# Patient Record
Sex: FEMALE | Race: WHITE
[De-identification: names, ages, dates, MRNs, and addresses within clinical notes are randomized per-mention and may not be internally consistent; named-entity substitution may affect disease eponyms.]

---

## 2021-06-10 ENCOUNTER — HOSPITAL ENCOUNTER (EMERGENCY)
Dept: HOSPITAL 33 - ED | Age: 21
Discharge: ANOTHER HOSPITAL | End: 2021-06-10
Payer: COMMERCIAL

## 2021-06-10 VITALS — OXYGEN SATURATION: 98 %

## 2021-06-10 VITALS — DIASTOLIC BLOOD PRESSURE: 65 MMHG | HEART RATE: 90 BPM | SYSTOLIC BLOOD PRESSURE: 107 MMHG

## 2021-06-10 DIAGNOSIS — D61.1: ICD-10-CM

## 2021-06-10 DIAGNOSIS — D69.6: ICD-10-CM

## 2021-06-10 DIAGNOSIS — D64.9: Primary | ICD-10-CM

## 2021-06-10 LAB
ABO GROUP BLD: (no result)
ALBUMIN SERPL-MCNC: 4.6 G/DL (ref 3.5–5)
ALP SERPL-CCNC: 57 U/L (ref 38–126)
ALT SERPL-CCNC: 12 U/L (ref 0–35)
ANION GAP SERPL CALC-SCNC: 13.5 MEQ/L (ref 5–15)
AST SERPL QL: 23 U/L (ref 14–36)
BASOPHILS # BLD AUTO: 0.02 10*3/UL (ref 0–0.4)
BASOPHILS NFR BLD AUTO: 0.6 % (ref 0–0.4)
BILIRUB BLD-MCNC: 0.8 MG/DL (ref 0.2–1.3)
BUN SERPL-MCNC: 7 MG/DL (ref 7–17)
CALCIUM SPEC-MCNC: 9.1 MG/DL (ref 8.4–10.2)
CHLORIDE SERPL-SCNC: 101 MMOL/L (ref 98–107)
CO2 SERPL-SCNC: 25 MMOL/L (ref 22–30)
CREAT SERPL-MCNC: 0.36 MG/DL (ref 0.52–1.04)
EOSINOPHIL # BLD AUTO: 0 10*3/UL (ref 0–0.5)
GFR SERPLBLD BASED ON 1.73 SQ M-ARVRAT: > 60 ML/MIN
GLUCOSE SERPL-MCNC: 87 MG/DL (ref 74–106)
GLUCOSE UR-MCNC: NEGATIVE MG/DL
HCT VFR BLD AUTO: 23.4 % (ref 35–47)
HGB BLD-MCNC: 5.8 GM/DL (ref 12–16)
IRON SERPL-MCNC: 21 UG/DL (ref 37–170)
IRON+TIBC PNL SERPL: 445 UG/DL (ref 265–462)
LYMPHOCYTES # SPEC AUTO: 1.24 10*3/UL (ref 1–4.6)
MAGNESIUM SERPL-MCNC: 2 MG/DL (ref 1.6–2.3)
MCH RBC QN AUTO: 16.4 PG (ref 26–32)
MCHC RBC AUTO-ENTMCNC: 24.8 G/DL (ref 32–36)
MONOCYTES # BLD AUTO: 0.52 10*3/UL (ref 0–1.3)
PLATELET # BLD AUTO: 153 K/MM3 (ref 150–450)
POTASSIUM SERPLBLD-SCNC: 3.4 MMOL/L (ref 3.5–5.1)
PROT SERPL-MCNC: 7.3 G/DL (ref 6.3–8.2)
PROT UR STRIP-MCNC: 100 MG/DL
RBC # BLD AUTO: 3.54 M/MM3 (ref 4.1–5.4)
RBC #/AREA URNS HPF: (no result) /HPF (ref 0–2)
RH BLD: POSITIVE
SODIUM SERPL-SCNC: 136 MMOL/L (ref 137–145)
TYPE + XM PNL BLD: (no result)
WBC # BLD AUTO: 3.5 K/MM3 (ref 4–10.5)
WBC #/AREA URNS HPF: >100 /HPF (ref 0–5)

## 2021-06-10 PROCEDURE — 86900 BLOOD TYPING SEROLOGIC ABO: CPT

## 2021-06-10 PROCEDURE — 86922 COMPATIBILITY TEST ANTIGLOB: CPT

## 2021-06-10 PROCEDURE — 36415 COLL VENOUS BLD VENIPUNCTURE: CPT

## 2021-06-10 PROCEDURE — 85045 AUTOMATED RETICULOCYTE COUNT: CPT

## 2021-06-10 PROCEDURE — 85025 COMPLETE CBC W/AUTO DIFF WBC: CPT

## 2021-06-10 PROCEDURE — 96365 THER/PROPH/DIAG IV INF INIT: CPT

## 2021-06-10 PROCEDURE — 36000 PLACE NEEDLE IN VEIN: CPT

## 2021-06-10 PROCEDURE — 87186 SC STD MICRODIL/AGAR DIL: CPT

## 2021-06-10 PROCEDURE — 81001 URINALYSIS AUTO W/SCOPE: CPT

## 2021-06-10 PROCEDURE — 36430 TRANSFUSION BLD/BLD COMPNT: CPT

## 2021-06-10 PROCEDURE — 82746 ASSAY OF FOLIC ACID SERUM: CPT

## 2021-06-10 PROCEDURE — 84466 ASSAY OF TRANSFERRIN: CPT

## 2021-06-10 PROCEDURE — 93041 RHYTHM ECG TRACING: CPT

## 2021-06-10 PROCEDURE — 99285 EMERGENCY DEPT VISIT HI MDM: CPT

## 2021-06-10 PROCEDURE — 93005 ELECTROCARDIOGRAM TRACING: CPT

## 2021-06-10 PROCEDURE — 83540 ASSAY OF IRON: CPT

## 2021-06-10 PROCEDURE — 83735 ASSAY OF MAGNESIUM: CPT

## 2021-06-10 PROCEDURE — 82728 ASSAY OF FERRITIN: CPT

## 2021-06-10 PROCEDURE — 86850 RBC ANTIBODY SCREEN: CPT

## 2021-06-10 PROCEDURE — 87086 URINE CULTURE/COLONY COUNT: CPT

## 2021-06-10 PROCEDURE — 96360 HYDRATION IV INFUSION INIT: CPT

## 2021-06-10 PROCEDURE — 86901 BLOOD TYPING SEROLOGIC RH(D): CPT

## 2021-06-10 PROCEDURE — 80053 COMPREHEN METABOLIC PANEL: CPT

## 2021-06-10 PROCEDURE — 87077 CULTURE AEROBIC IDENTIFY: CPT

## 2021-06-10 PROCEDURE — 94760 N-INVAS EAR/PLS OXIMETRY 1: CPT

## 2021-06-10 PROCEDURE — 83550 IRON BINDING TEST: CPT

## 2021-06-10 NOTE — ERPHSYRPT
- History of Present Illness


Time Seen by Provider: 06/10/21 17:55


Source: patient


Exam Limitations: no limitations


Patient Subjective Stated Complaint: Abnormal labs


Triage Nursing Assessment: Patient ambulated back to ED and transferred self to 

bed. Patient A+O  X 3. Patient's skin pale, warm and dry. Patient states today 

she got routine blood work done and received a call to come to ED due to 

abnormal labs.  NP RUTH Snyder called ER and stated patient's hemaglobin was 5.5

and needed to be further evaluated. Patient denies pain or discomfort. Lungs 

clear a/p ferny.  Slight swelling noted to BLE.  Patient received Mandeep and 

Mandeep COVID vaccine yesterday.  Patient does state she has been having heavier

menstural cycles.


Physician History: 


Patient is a 21-year-old female presents to our ED as a referral from her 

primary care doctor for evaluation of profound anemia.  Patient has a hemoglobin

of 5.3.  Patient otherwise asymptomatic.  She complains of no chest pain.  No 

fatigue.  No nausea or vomiting no diarrhea.  No rash.  Symptoms are minimal at 

this time.  Patient states she received a Covid vaccination in the past week.  

No nausea or vomiting no diaphoresis no lightheadedness.





Timing/Duration: today


Severity: mild


Modifying Factors: Improves With: nothing


Associated Symptoms: denies symptoms


Allergies/Adverse Reactions: 








No Known Drug Allergies Allergy (Unverified 06/10/21 17:47)


   





Home Medications: 








No Reportable Medications [No Reported Medications]  06/10/21 [History]





Hx Influenza Vaccination/Date Given: Yes


Hx Pneumococcal Vaccination/Date Given: No


Immunizations Up to Date: Yes





Travel Risk





- International Travel


Have you traveled outside of the country in past 3 weeks: No





- Coronavirus Screening


Are you exhibiting any of the following symptoms?: No


Close contact with a COVID-19 positive Pt in past 14-21 Days: No





- Vaccine Status


Have you recieved a Covid-19 vaccination: Yes


: [employer]





- Review of Systems


Constitutional: No Symptoms, No Fever, No Chills


Eyes: No Symptoms


Ears, Nose, & Throat: No Symptoms


Respiratory: No Symptoms, No Cough, No Dyspnea


Cardiac: No Symptoms, No Chest Pain, No Edema, No Syncope


Abdominal/Gastrointestinal: No Symptoms, No Abdominal Pain, No Nausea, No 

Vomiting, No Diarrhea


Genitourinary Symptoms: No Symptoms, No Dysuria


Musculoskeletal: No Symptoms, No Back Pain, No Neck Pain


Skin: No Symptoms, No Rash


Neurological: No Symptoms, No Dizziness, No Focal Weakness, No Sensory Changes


Psychological: No Symptoms


Endocrine: No Symptoms


Hematologic/Lymphatic: No Symptoms


Immunological/Allergic: No Symptoms


All Other Systems: Reviewed and Negative





- Past Medical History


Pertinent Past Medical History: No


Neurological History: No Pertinent History


ENT History: No Pertinent History


Cardiac History: No Pertinent History


Respiratory History: No Pertinent History


Endocrine Medical History: No Pertinent History


Musculoskeletal History: No Pertinent History


GI Medical History: No Pertinent History


 History: No Pertinent History


Psycho-Social History: No Pertinent History


Female Reproductive Disorders: No Pertinent History





- Past Surgical History


Past Surgical History: No


Neuro Surgical History: No Pertinent History


Cardiac: No Pertinent History


Respiratory: No Pertinent History


Gastrointestinal: No Pertinent History


Genitourinary: No Pertinent History


Musculoskeletal: No Pertinent History


Female Surgical History: No Pertinent History





- Social History


Smoking Status: Never smoker


Exposure to second hand smoke: No


Drug Use: none


Patient Lives Alone: Yes





- Female History


Hx Last Menstrual Period: last week


Hx Pregnant Now: No





- Nursing Vital Signs


Nursing Vital Signs: 





                               Initial Vital Signs











Temperature  98.5 F   06/10/21 17:49


 


Pulse Rate  99 H  06/10/21 17:49


 


Respiratory Rate  18   06/10/21 17:49


 


Blood Pressure  125/71   06/10/21 17:49


 


O2 Sat by Pulse Oximetry  98   06/10/21 17:49








                                   Pain Scale











Pain Intensity                 0

















- Physical Exam


General Appearance: no apparent distress, alert


Eye Exam: PERRL/EOMI, eyes nml inspection


Ears, Nose, Throat Exam: normal ENT inspection, TMs normal, pharynx normal, 

moist mucous membranes


Neck Exam: normal inspection, non-tender, supple, full range of motion


Respiratory Exam: normal breath sounds, lungs clear, No respiratory distress


Cardiovascular Exam: regular rate/rhythm, normal heart sounds, normal peripheral

 pulses


Gastrointestinal/Abdomen Exam: soft, normal bowel sounds, No tenderness, No mass


Back Exam: normal inspection, normal range of motion, No CVA tenderness, No 

vertebral tenderness


Extremity Exam: normal inspection, normal range of motion, pelvis stable


Neurologic Exam: alert, oriented x 3, cooperative, normal mood/affect, nml 

cerebellar function, nml station & gait, sensation nml, No motor deficits


Skin Exam: normal color, warm, dry, No rash


Lymphatic Exam: No adenopathy


**SpO2 Interpretation**: normal


SpO2: 98


O2 Delivery: Room Air





- Course


Nursing assessment & vital signs reviewed: Yes


EKG Interpreted by Me: RATE ([id number]), Sinus Rhythm, NORMAL AXIS, NORMAL INTERVALS


Ordered Tests: 





                               Active Orders 24 hr











 Category Date Time Status


 


 Cardiac Monitor STAT Care  06/10/21 17:55 Active


 


 EKG-ER Only STAT Care  06/10/21 17:54 Active


 


 IV Insertion STAT Care  06/10/21 17:54 Active


 


 Pulse Oximetry (ED) STAT Care  06/10/21 17:54 Active


 


 CBC W DIFF Stat Lab  06/10/21 18:05 Completed


 


 CMP Stat Lab  06/10/21 18:05 Completed


 


 CULTURE,URINE Stat Lab  06/10/21 17:57 Received


 


 Ferritin Stat Lab  06/10/21 Ordered


 


 Folate (Folic Acid) Stat Lab  06/10/21 Ordered


 


 MAGNESIUM Stat Lab  06/10/21 18:05 Completed


 


 UA W/RFX UR CULTURE Stat Lab  06/10/21 17:57 Completed








Medication Summary














Discontinued Medications














Generic Name Dose Route Start Last Admin





  Trade Name Freq  PRN Reason Stop Dose Admin


 


Sodium Chloride  1,000 mls @ 999 mls/hr  06/10/21 17:54  06/10/21 19:06





  Sodium Chloride 0.9% 1000 Ml  IV  06/10/21 18:54  Infused





  .Q1H1M STA   Infusion


 


Sodium Chloride  Confirm  06/10/21 17:58 





  Sodium Chloride 0.9% 1000 Ml  Administered  06/10/21 17:59 





  Dose  





  1,000 mls @ ud  





  .ROUTE  





  .STK-MED ONE  











Lab/Rad Data: 





                           Laboratory Result Diagrams





                                 06/10/21 18:05 





                                 06/10/21 18:05 





                               Laboratory Results











  06/10/21 06/10/21 06/10/21 Range/Units





  18:05 18:05 18:05 


 


WBC     (4.0-10.5)  K/mm3


 


RBC     (4.1-5.4)  M/mm3


 


Hgb     (12.0-16.0)  gm/dl


 


Hct     (35-47)  %


 


MCV     ()  fl


 


MCH     (26-32)  pg


 


MCHC     (32-36)  g/dl


 


RDW     (11.5-14.0)  %


 


Plt Count     (150-450)  K/mm3


 


MPV     (7.5-11.0)  fl


 


Gran %     (36.0-66.0)  %


 


Eos # (Auto)     (0-0.5)  


 


Absolute Lymphs (auto)     (1.0-4.6)  


 


Absolute Monos (auto)     (0.0-1.3)  


 


Lymphocytes %     (24.0-44.0)  %


 


Monocytes %     (0.0-12.0)  %


 


Eosinophils %     (0.00-5.0)  %


 


Basophils %     (0.0-0.4)  %


 


Absolute Granulocytes     (1.4-6.9)  


 


Basophils #     (0-0.4)  


 


Sodium     (137-145)  mmol/L


 


Potassium     (3.5-5.1)  mmol/L


 


Chloride     ()  mmol/L


 


Carbon Dioxide     (22-30)  mmol/L


 


Anion Gap     (5-15)  MEQ/L


 


BUN     (7-17)  mg/dL


 


Creatinine     (0.52-1.04)  mg/dL


 


Estimated GFR     ML/MIN


 


Glucose     ()  mg/dL


 


Calcium     (8.4-10.2)  mg/dL


 


Magnesium     (1.6-2.3)  mg/dL


 


Iron  21 L  22 L   ()  ug/dL


 


TIBC  445    (265-462)  ug/dL


 


Iron Saturation  5 L    (20-39)  %


 


Total Bilirubin     (0.2-1.3)  mg/dL


 


AST     (14-36)  U/L


 


ALT     (0-35)  U/L


 


Alkaline Phosphatase     ()  U/L


 


Serum Total Protein     (6.3-8.2)  g/dL


 


Albumin     (3.5-5.0)  g/dL


 


Urine Color     (YELLOW)  


 


Urine Appearance     (CLEAR)  


 


Urine pH     (5-6)  


 


Ur Specific Gravity     (1.005-1.025)  


 


Urine Protein     (Negative)  


 


Urine Ketones     (NEGATIVE)  


 


Urine Blood     (0-5)  Tom/ul


 


Urine Nitrite     (NEGATIVE)  


 


Urine Bilirubin     (NEGATIVE)  


 


Urine Urobilinogen     (0-1)  mg/dL


 


Ur Leukocyte Esterase     (NEGATIVE)  


 


Urine WBC (Auto)     (0-5)  /HPF


 


Urine RBC (Auto)     (0-2)  /HPF


 


U Hyaline Cast (Auto)     (0-2)  /LPF


 


U Epithel Cells (Auto)     (FEW)  /HPF


 


Urine Bacteria (Auto)     (NEGATIVE)  /HPF


 


Urine Mucus (Auto)     (NEGATIVE)  /HPF


 


Urine Culture Reflexed     (NO)  


 


Urine Glucose     (NEGATIVE)  mg/dL


 


ABO Group    A  


 


Rh Factor    POSITIVE  


 


Antibody Screen    NEGATIVE  (NEGATIVE)  














  06/10/21 06/10/21 06/10/21 Range/Units





  18:05 18:05 17:57 


 


WBC   3.5 L   (4.0-10.5)  K/mm3


 


RBC   3.54 L   (4.1-5.4)  M/mm3


 


Hgb   5.8 L*   (12.0-16.0)  gm/dl


 


Hct   23.4 L   (35-47)  %


 


MCV   66.1 L   ()  fl


 


MCH   16.4 L   (26-32)  pg


 


MCHC   24.8 L   (32-36)  g/dl


 


RDW   19.7 H   (11.5-14.0)  %


 


Plt Count   153   (150-450)  K/mm3


 


MPV   9.3   (7.5-11.0)  fl


 


Gran %   49.3   (36.0-66.0)  %


 


Eos # (Auto)   0   (0-0.5)  


 


Absolute Lymphs (auto)   1.24   (1.0-4.6)  


 


Absolute Monos (auto)   0.52   (0.0-1.3)  


 


Lymphocytes %   35.3   (24.0-44.0)  %


 


Monocytes %   14.8 H   (0.0-12.0)  %


 


Eosinophils %   0.0   (0.00-5.0)  %


 


Basophils %   0.6   (0.0-0.4)  %


 


Absolute Granulocytes   1.73   (1.4-6.9)  


 


Basophils #   0.02   (0-0.4)  


 


Sodium  136 L    (137-145)  mmol/L


 


Potassium  3.4 L    (3.5-5.1)  mmol/L


 


Chloride  101    ()  mmol/L


 


Carbon Dioxide  25    (22-30)  mmol/L


 


Anion Gap  13.5    (5-15)  MEQ/L


 


BUN  7    (7-17)  mg/dL


 


Creatinine  0.36 L    (0.52-1.04)  mg/dL


 


Estimated GFR  > 60.0    ML/MIN


 


Glucose  87    ()  mg/dL


 


Calcium  9.1    (8.4-10.2)  mg/dL


 


Magnesium  2.0    (1.6-2.3)  mg/dL


 


Iron     ()  ug/dL


 


TIBC     (265-462)  ug/dL


 


Iron Saturation     (20-39)  %


 


Total Bilirubin  0.80    (0.2-1.3)  mg/dL


 


AST  23    (14-36)  U/L


 


ALT  12    (0-35)  U/L


 


Alkaline Phosphatase  57    ()  U/L


 


Serum Total Protein  7.3    (6.3-8.2)  g/dL


 


Albumin  4.6    (3.5-5.0)  g/dL


 


Urine Color    HAYLEE  (YELLOW)  


 


Urine Appearance    CLOUDY  (CLEAR)  


 


Urine pH    6.0  (5-6)  


 


Ur Specific Gravity    1.025  (1.005-1.025)  


 


Urine Protein    100  (Negative)  


 


Urine Ketones    TRACE  (NEGATIVE)  


 


Urine Blood    NEGATIVE  (0-5)  Tom/ul


 


Urine Nitrite    POSITIVE  (NEGATIVE)  


 


Urine Bilirubin    NEGATIVE  (NEGATIVE)  


 


Urine Urobilinogen    4  (0-1)  mg/dL


 


Ur Leukocyte Esterase    MODERATE  (NEGATIVE)  


 


Urine WBC (Auto)    >100  (0-5)  /HPF


 


Urine RBC (Auto)    6-10  (0-2)  /HPF


 


U Hyaline Cast (Auto)    6-10  (0-2)  /LPF


 


U Epithel Cells (Auto)    NONE  (FEW)  /HPF


 


Urine Bacteria (Auto)    RARE  (NEGATIVE)  /HPF


 


Urine Mucus (Auto)    MANY  (NEGATIVE)  /HPF


 


Urine Culture Reflexed    YES  (NO)  


 


Urine Glucose    NEGATIVE  (NEGATIVE)  mg/dL


 


ABO Group     


 


Rh Factor     


 


Antibody Screen     (NEGATIVE)  














- Progress


Progress: improved


Progress Note: 


Patient is a 21-year-old female presents to our emergency department as a 

referral from her primary doctor for evaluation of anemia.  Patient's hemoglobin

 in our ED was 5.8.  Patient denies blood loss.  She does have heavy menstrual 

cycles.  Patient will require transfusion.  Case discussed with Dr. Truong.  He

 advised transfer as patient will require hematology consultation.  Iron studies

 obtained.  We do not have hematology in our hospital.  Plan of care discussed 

with patient and mother.  They prefer transfer to [facility] for further 

evaluation and treatment of this profound anemia.  Case discussed with Dr. Guadalupe ED physician at [facility] who accepts transfer.


06/10/21 20:01





Discussed with .: Dejah


Will see patient in: office


Counseled pt/family regarding: lab results, diagnosis, rad results





- Departure


Departure Disposition: Transfer


Clinical Impression: 


 Profound anemia, Thrombocytopenia, Bone marrow suppression





Condition: Stable


Critical Care Time: No


Referrals: 


DOCTOR,NO FAMILY [Primary Care Provider] -